# Patient Record
Sex: FEMALE | Race: WHITE | NOT HISPANIC OR LATINO | Employment: FULL TIME | URBAN - METROPOLITAN AREA
[De-identification: names, ages, dates, MRNs, and addresses within clinical notes are randomized per-mention and may not be internally consistent; named-entity substitution may affect disease eponyms.]

---

## 2020-02-13 ENCOUNTER — HOSPITAL ENCOUNTER (EMERGENCY)
Facility: HOSPITAL | Age: 55
Discharge: HOME | End: 2020-02-13
Attending: EMERGENCY MEDICINE
Payer: COMMERCIAL

## 2020-02-13 VITALS
RESPIRATION RATE: 16 BRPM | SYSTOLIC BLOOD PRESSURE: 112 MMHG | WEIGHT: 218.69 LBS | HEART RATE: 68 BPM | DIASTOLIC BLOOD PRESSURE: 68 MMHG | OXYGEN SATURATION: 97 % | HEIGHT: 66 IN | TEMPERATURE: 98 F | BODY MASS INDEX: 35.15 KG/M2

## 2020-02-13 DIAGNOSIS — K27.9 PEPTIC ULCER: ICD-10-CM

## 2020-02-13 DIAGNOSIS — R10.13 EPIGASTRIC PAIN: ICD-10-CM

## 2020-02-13 DIAGNOSIS — N28.89 RIGHT RENAL MASS: Primary | ICD-10-CM

## 2020-02-13 LAB
ALBUMIN SERPL BCP-MCNC: 3.8 G/DL (ref 3.5–5.2)
ALP SERPL-CCNC: 103 U/L (ref 55–135)
ALT SERPL W/O P-5'-P-CCNC: 25 U/L (ref 10–44)
ANION GAP SERPL CALC-SCNC: 8 MMOL/L (ref 8–16)
AST SERPL-CCNC: 17 U/L (ref 10–40)
BASOPHILS # BLD AUTO: 0.07 K/UL (ref 0–0.2)
BASOPHILS NFR BLD: 0.8 % (ref 0–1.9)
BILIRUB SERPL-MCNC: 0.2 MG/DL (ref 0.1–1)
BILIRUB UR QL STRIP: NEGATIVE
BUN SERPL-MCNC: 14 MG/DL (ref 6–20)
CALCIUM SERPL-MCNC: 9.3 MG/DL (ref 8.7–10.5)
CHLORIDE SERPL-SCNC: 105 MMOL/L (ref 95–110)
CLARITY UR: CLEAR
CO2 SERPL-SCNC: 27 MMOL/L (ref 23–29)
COLOR UR: YELLOW
CREAT SERPL-MCNC: 0.7 MG/DL (ref 0.5–1.4)
DIFFERENTIAL METHOD: ABNORMAL
EOSINOPHIL # BLD AUTO: 0.7 K/UL (ref 0–0.5)
EOSINOPHIL NFR BLD: 7.3 % (ref 0–8)
ERYTHROCYTE [DISTWIDTH] IN BLOOD BY AUTOMATED COUNT: 12.7 % (ref 11.5–14.5)
EST. GFR  (AFRICAN AMERICAN): >60 ML/MIN/1.73 M^2
EST. GFR  (NON AFRICAN AMERICAN): >60 ML/MIN/1.73 M^2
GLUCOSE SERPL-MCNC: 116 MG/DL (ref 70–110)
GLUCOSE UR QL STRIP: NEGATIVE
HCT VFR BLD AUTO: 45.4 % (ref 37–48.5)
HGB BLD-MCNC: 14.7 G/DL (ref 12–16)
HGB UR QL STRIP: ABNORMAL
IMM GRANULOCYTES # BLD AUTO: 0.03 K/UL (ref 0–0.04)
KETONES UR QL STRIP: NEGATIVE
LEUKOCYTE ESTERASE UR QL STRIP: NEGATIVE
LIPASE SERPL-CCNC: 19 U/L (ref 4–60)
LYMPHOCYTES # BLD AUTO: 2.4 K/UL (ref 1–4.8)
LYMPHOCYTES NFR BLD: 26.4 % (ref 18–48)
MCH RBC QN AUTO: 28.8 PG (ref 27–31)
MCHC RBC AUTO-ENTMCNC: 32.4 G/DL (ref 32–36)
MCV RBC AUTO: 89 FL (ref 82–98)
MONOCYTES # BLD AUTO: 0.5 K/UL (ref 0.3–1)
MONOCYTES NFR BLD: 6 % (ref 4–15)
NEUTROPHILS # BLD AUTO: 5.4 K/UL (ref 1.8–7.7)
NEUTROPHILS NFR BLD: 59.2 % (ref 38–73)
NITRITE UR QL STRIP: NEGATIVE
NRBC BLD-RTO: 0 /100 WBC
PH UR STRIP: 7 [PH] (ref 5–8)
PLATELET # BLD AUTO: 201 K/UL (ref 150–350)
PMV BLD AUTO: 10.8 FL (ref 9.2–12.9)
POTASSIUM SERPL-SCNC: 4 MMOL/L (ref 3.5–5.1)
PROT SERPL-MCNC: 7 G/DL (ref 6–8.4)
PROT UR QL STRIP: NEGATIVE
RBC # BLD AUTO: 5.1 M/UL (ref 4–5.4)
SODIUM SERPL-SCNC: 140 MMOL/L (ref 136–145)
SP GR UR STRIP: 1.02 (ref 1–1.03)
URN SPEC COLLECT METH UR: ABNORMAL
UROBILINOGEN UR STRIP-ACNC: 1 EU/DL
WBC # BLD AUTO: 9.03 K/UL (ref 3.9–12.7)

## 2020-02-13 PROCEDURE — 96374 THER/PROPH/DIAG INJ IV PUSH: CPT

## 2020-02-13 PROCEDURE — 25000003 PHARM REV CODE 250: Performed by: EMERGENCY MEDICINE

## 2020-02-13 PROCEDURE — 80053 COMPREHEN METABOLIC PANEL: CPT

## 2020-02-13 PROCEDURE — 83690 ASSAY OF LIPASE: CPT

## 2020-02-13 PROCEDURE — 81003 URINALYSIS AUTO W/O SCOPE: CPT

## 2020-02-13 PROCEDURE — 63600175 PHARM REV CODE 636 W HCPCS: Performed by: EMERGENCY MEDICINE

## 2020-02-13 PROCEDURE — 85025 COMPLETE CBC W/AUTO DIFF WBC: CPT

## 2020-02-13 PROCEDURE — 99284 EMERGENCY DEPT VISIT MOD MDM: CPT | Mod: 25

## 2020-02-13 PROCEDURE — 36415 COLL VENOUS BLD VENIPUNCTURE: CPT

## 2020-02-13 RX ORDER — PANTOPRAZOLE SODIUM 40 MG/1
40 TABLET, DELAYED RELEASE ORAL
Status: COMPLETED | OUTPATIENT
Start: 2020-02-13 | End: 2020-02-13

## 2020-02-13 RX ORDER — KETOROLAC TROMETHAMINE 30 MG/ML
30 INJECTION, SOLUTION INTRAMUSCULAR; INTRAVENOUS
Status: COMPLETED | OUTPATIENT
Start: 2020-02-13 | End: 2020-02-13

## 2020-02-13 RX ORDER — PANTOPRAZOLE SODIUM 40 MG/1
40 TABLET, DELAYED RELEASE ORAL DAILY
Qty: 30 TABLET | Refills: 3 | Status: SHIPPED | OUTPATIENT
Start: 2020-02-13 | End: 2021-02-12

## 2020-02-13 RX ADMIN — LIDOCAINE HYDROCHLORIDE: 20 SOLUTION ORAL; TOPICAL at 02:02

## 2020-02-13 RX ADMIN — KETOROLAC TROMETHAMINE 30 MG: 30 INJECTION, SOLUTION INTRAMUSCULAR at 03:02

## 2020-02-13 RX ADMIN — PANTOPRAZOLE SODIUM 40 MG: 40 TABLET, DELAYED RELEASE ORAL at 02:02

## 2020-02-13 NOTE — ED PROVIDER NOTES
Encounter Date: 2/13/2020    SCRIBE #1 NOTE: I, Marysol Pedersen, am scribing for, and in the presence of, Lan Christy MD.       History     Chief Complaint   Patient presents with    Abdominal Pain     RUQ abdominal pain       Time seen by provider: 2:30 PM on 02/13/2020    Quita Lamas is a 54 y.o. female who presents to the ED with an onset of right upper abdominal pain. Patients states that pain is chronic and ongoing for > x2 months, worsening in the passed x 2 wks. She states that abdomen bulges with associated cramping and night sweats. She endorses that pain is worsened when twisted or lying on her side. Patient denies symptoms of fever, and chills nausea, vomiting, diarrhea, or black/tarry stool. No PMHx of ulcers or liver complications. No abdominal PSHx.    The history is provided by the patient.     Review of patient's allergies indicates:   Allergen Reactions    Codeine     Iodine and iodide containing products     Penicillins      History reviewed. No pertinent past medical history.  History reviewed. No pertinent surgical history.  History reviewed. No pertinent family history.  Social History     Tobacco Use    Smoking status: Current Every Day Smoker   Substance Use Topics    Alcohol use: Not on file    Drug use: Not on file     Review of Systems   Constitutional: Positive for diaphoresis. Negative for activity change, appetite change, chills, fatigue and fever.   Eyes: Negative for visual disturbance.   Respiratory: Negative for apnea and shortness of breath.    Cardiovascular: Negative for chest pain and palpitations.   Gastrointestinal: Positive for abdominal distention and abdominal pain. Negative for blood in stool, diarrhea, nausea and vomiting.   Genitourinary: Negative for difficulty urinating.   Musculoskeletal: Negative for neck pain.   Skin: Negative for pallor and rash.   Neurological: Negative for headaches.   Hematological: Does not bruise/bleed easily.    Psychiatric/Behavioral: Negative for agitation.       Physical Exam     Initial Vitals [02/13/20 1408]   BP Pulse Resp Temp SpO2   (!) 143/72 (!) 111 16 98.1 °F (36.7 °C) 98 %      MAP       --         Physical Exam    Nursing note and vitals reviewed.  Constitutional: She appears well-developed and well-nourished.   HENT:   Head: Normocephalic and atraumatic.   Eyes: Conjunctivae are normal.   Neck: Normal range of motion. Neck supple.   Cardiovascular: Normal rate and regular rhythm. Exam reveals no gallop and no friction rub.    No murmur heard.  Pulmonary/Chest: Effort normal and breath sounds normal. No respiratory distress. She has no wheezes. She has no rhonchi. She has no rales.   Abdominal: Soft. She exhibits no distension. There is tenderness in the right upper quadrant and epigastric area. There is no CVA tenderness.   Musculoskeletal: Normal range of motion.   Neurological: She is alert and oriented to person, place, and time.   Skin: Skin is warm and dry. No erythema.   Psychiatric: She has a normal mood and affect.         ED Course   Procedures  Labs Reviewed   CBC W/ AUTO DIFFERENTIAL - Abnormal; Notable for the following components:       Result Value    Eos # 0.7 (*)     All other components within normal limits   COMPREHENSIVE METABOLIC PANEL - Abnormal; Notable for the following components:    Glucose 116 (*)     All other components within normal limits   URINALYSIS, REFLEX TO URINE CULTURE - Abnormal; Notable for the following components:    Occult Blood UA Trace (*)     All other components within normal limits    Narrative:     Preferred Collection Type->Urine, Clean Catch   LIPASE          Imaging Results          CT Abdomen Pelvis  Without Contrast (Final result)  Result time 02/13/20 17:30:10    Final result by Beverly Del Angel MD (02/13/20 17:30:10)                 Impression:      Findings as detailed above including punctate nonobstructing right renal stone.  Probable 5 mm high  density right renal mass could be complex cyst, high density cyst or solid mass.  No acute findings      Electronically signed by: Beverly Del Angel MD  Date:    02/13/2020  Time:    17:30             Narrative:    EXAMINATION:  CT ABDOMEN PELVIS WITHOUT CONTRAST    CLINICAL HISTORY:  Epigastric pain; Epigastric pain    TECHNIQUE:  Low dose axial images, sagittal and coronal reformations were obtained from the lung bases to the pubic symphysis.  No p.o. or IV contrast    COMPARISON:  None    FINDINGS:  Liver unremarkable appearance    Spleen not enlarged    Gallbladder contracted with no calcified stones or CT findings of acute cholecystitis    Adrenal glands unremarkable appearance    Pancreas unremarkable appearance    Abdominal aorta no aneurysm    Stomach, bowel, mesentery; normal appearance of the appendix.  No free intraperitoneal air or fluid.  No appreciable bowel wall thickening or inflammatory change    Reproductive organs unremarkable appearance    Small fat containing umbilical hernia    Kidneys, ureters, bladder; punctate nonobstructing right renal stone.  No hydronephrosis.  No opaque ureteral stone or ureteral obstruction.  Five mm exophytic mass upper pole of the right kidney versus lobular contour from renal scarring more suggestive of small slightly hyperdense mass for example sagittal series 602, image 73 axial series 2, image 49 and coronal series 601, image 99.  Urinary bladder just mildly distended at time of the exam and unremarkable appearance    There is bilateral SI joint sclerosis and there are osseous degenerative changes.  There is very mild retrolisthesis L3-L4.                               US Abdomen Limited (Final result)  Result time 02/13/20 15:54:24    Final result by Sal Cazares Jr., MD (02/13/20 15:54:24)                 Impression:      No significant sonographic abnormality.      Electronically signed by: Sal Cazares MD  Date:    02/13/2020  Time:    15:54              Narrative:    EXAMINATION:  US ABDOMEN LIMITED    CLINICAL HISTORY:  Epigastric pain;    TECHNIQUE:  Limited ultrasound of the right upper quadrant of the abdomen (including pancreas, liver, gallbladder, common bile duct, and spleen) was performed.    COMPARISON:  None.    FINDINGS:  Liver: Normal in size, measuring 13 cm. Homogeneous echotexture. No focal hepatic lesions.    Gallbladder: No calculi, wall thickening, or pericholecystic fluid.  No sonographic Cortez's sign.    Biliary system: The common duct is not dilated, measuring 7 mm.  No intrahepatic ductal dilatation.    The right kidney measures 11.6 cm without mass or hydronephrosis.    Miscellaneous: No upper abdominal ascites.                                 Medical Decision Making:   History:   Old Medical Records: I decided to obtain old medical records.  Clinical Tests:   Lab Tests: Ordered and Reviewed  Radiological Study: Ordered and Reviewed  ED Management:  54-year-old female presents with chronic epigastric pain which is intermittently worse with meals.  Symptoms most likely reflect peptic ulcer disease.  Right upper quadrant ultrasound fails to demonstrate any evidence of gallstones.  Biliary disease cannot be excluded.  She is encouraged to follow up with Gastroenterology for consideration of upper endoscopy.  She is also found have a right renal mass and is referred to Urology for workup of same.  There is no evidence of bowel obstruction, diverticulitis, retroperitoneal hemorrhage or pancreatitis.       APC / Resident Notes:   I, Dr. Lan Christy III, personally performed the services described in this documentation. All medical record entries made by the scribe were at my direction and in my presence.  I have reviewed the chart and agree that the record reflects my personal performance and is accurate and complete       Scribe Attestation:   Scribe #1: I performed the above scribed service and the documentation accurately describes the  services I performed. I attest to the accuracy of the note.                  Clinical Impression:       ICD-10-CM ICD-9-CM   1. Right renal mass N28.89 593.9   2. Epigastric pain R10.13 789.06   3. Peptic ulcer K27.9 533.90         Disposition:   Disposition: Discharged  Condition: Stable                     Lan Christy III, MD  02/13/20 0096

## 2020-02-13 NOTE — ED NOTES
Pt presents with complaints of right upper quadrant abdominal pain and nausea with burning sensation in throat. Pt has no know history of acid reflux. Wakes up with this daily. Feels tightening in abdomen like something in there trying to come out the side of her. Abdomen soft and non distended. Alert and oriented with neuro intact. Reports nausea at this time.

## 2022-11-29 ENCOUNTER — HOSPITAL ENCOUNTER (EMERGENCY)
Facility: HOSPITAL | Age: 57
Discharge: LEFT BEFORE DISCHARGE | End: 2022-11-30
Attending: EMERGENCY MEDICINE

## 2022-11-29 DIAGNOSIS — R42 VERTIGO: ICD-10-CM

## 2022-11-29 DIAGNOSIS — R11.0 NAUSEA: ICD-10-CM

## 2022-11-29 DIAGNOSIS — R51.9 UNILATERAL HEADACHE: ICD-10-CM

## 2022-11-29 DIAGNOSIS — R07.9 RIGHT-SIDED CHEST PAIN: ICD-10-CM

## 2022-11-29 DIAGNOSIS — M54.2 NECK PAIN, ACUTE: Primary | ICD-10-CM

## 2022-11-29 DIAGNOSIS — R20.2 PARESTHESIA OF UPPER EXTREMITY: ICD-10-CM

## 2022-11-29 LAB
ALBUMIN SERPL BCP-MCNC: 4 G/DL (ref 3.5–5.2)
ALP SERPL-CCNC: 97 U/L (ref 55–135)
ALT SERPL W/O P-5'-P-CCNC: 24 U/L (ref 10–44)
ANION GAP SERPL CALC-SCNC: 11 MMOL/L (ref 8–16)
AST SERPL-CCNC: 18 U/L (ref 10–40)
BILIRUB SERPL-MCNC: 0.2 MG/DL (ref 0.1–1)
BNP SERPL-MCNC: 27 PG/ML (ref 0–99)
BUN SERPL-MCNC: 11 MG/DL (ref 6–20)
CALCIUM SERPL-MCNC: 9.4 MG/DL (ref 8.7–10.5)
CHLORIDE SERPL-SCNC: 103 MMOL/L (ref 95–110)
CO2 SERPL-SCNC: 24 MMOL/L (ref 23–29)
CREAT SERPL-MCNC: 0.7 MG/DL (ref 0.5–1.4)
EST. GFR  (NO RACE VARIABLE): >60 ML/MIN/1.73 M^2
GLUCOSE SERPL-MCNC: 106 MG/DL (ref 70–110)
INFLUENZA A, MOLECULAR: NEGATIVE
INFLUENZA B, MOLECULAR: NEGATIVE
POTASSIUM SERPL-SCNC: 4.4 MMOL/L (ref 3.5–5.1)
PROT SERPL-MCNC: 7.1 G/DL (ref 6–8.4)
SARS-COV-2 RDRP RESP QL NAA+PROBE: NEGATIVE
SODIUM SERPL-SCNC: 138 MMOL/L (ref 136–145)
SPECIMEN SOURCE: NORMAL

## 2022-11-29 PROCEDURE — 63600175 PHARM REV CODE 636 W HCPCS: Performed by: EMERGENCY MEDICINE

## 2022-11-29 PROCEDURE — 83880 ASSAY OF NATRIURETIC PEPTIDE: CPT | Performed by: EMERGENCY MEDICINE

## 2022-11-29 PROCEDURE — 85007 BL SMEAR W/DIFF WBC COUNT: CPT | Performed by: EMERGENCY MEDICINE

## 2022-11-29 PROCEDURE — 93010 EKG 12-LEAD: ICD-10-PCS | Mod: ,,, | Performed by: INTERNAL MEDICINE

## 2022-11-29 PROCEDURE — 93010 ELECTROCARDIOGRAM REPORT: CPT | Mod: ,,, | Performed by: INTERNAL MEDICINE

## 2022-11-29 PROCEDURE — 80053 COMPREHEN METABOLIC PANEL: CPT | Performed by: EMERGENCY MEDICINE

## 2022-11-29 PROCEDURE — U0002 COVID-19 LAB TEST NON-CDC: HCPCS | Performed by: EMERGENCY MEDICINE

## 2022-11-29 PROCEDURE — 25000003 PHARM REV CODE 250: Performed by: EMERGENCY MEDICINE

## 2022-11-29 PROCEDURE — 87502 INFLUENZA DNA AMP PROBE: CPT | Performed by: EMERGENCY MEDICINE

## 2022-11-29 PROCEDURE — 36415 COLL VENOUS BLD VENIPUNCTURE: CPT | Performed by: EMERGENCY MEDICINE

## 2022-11-29 PROCEDURE — 85027 COMPLETE CBC AUTOMATED: CPT | Performed by: EMERGENCY MEDICINE

## 2022-11-29 PROCEDURE — 96374 THER/PROPH/DIAG INJ IV PUSH: CPT

## 2022-11-29 PROCEDURE — 94760 N-INVAS EAR/PLS OXIMETRY 1: CPT

## 2022-11-29 PROCEDURE — 93005 ELECTROCARDIOGRAM TRACING: CPT

## 2022-11-29 PROCEDURE — 84484 ASSAY OF TROPONIN QUANT: CPT | Performed by: EMERGENCY MEDICINE

## 2022-11-29 PROCEDURE — 99285 EMERGENCY DEPT VISIT HI MDM: CPT | Mod: 25

## 2022-11-29 RX ORDER — ASPIRIN 325 MG
325 TABLET ORAL
Status: COMPLETED | OUTPATIENT
Start: 2022-11-29 | End: 2022-11-29

## 2022-11-29 RX ORDER — KETOROLAC TROMETHAMINE 30 MG/ML
10 INJECTION, SOLUTION INTRAMUSCULAR; INTRAVENOUS
Status: COMPLETED | OUTPATIENT
Start: 2022-11-29 | End: 2022-11-29

## 2022-11-29 RX ADMIN — ASPIRIN 325 MG: 325 TABLET ORAL at 10:11

## 2022-11-29 RX ADMIN — KETOROLAC TROMETHAMINE 10 MG: 30 INJECTION, SOLUTION INTRAMUSCULAR at 10:11

## 2022-11-30 VITALS
SYSTOLIC BLOOD PRESSURE: 118 MMHG | HEART RATE: 70 BPM | HEIGHT: 64 IN | RESPIRATION RATE: 20 BRPM | WEIGHT: 215 LBS | DIASTOLIC BLOOD PRESSURE: 57 MMHG | TEMPERATURE: 97 F | BODY MASS INDEX: 36.7 KG/M2 | OXYGEN SATURATION: 95 %

## 2022-11-30 LAB
BASOPHILS # BLD AUTO: ABNORMAL K/UL (ref 0–0.2)
BASOPHILS NFR BLD: 0 % (ref 0–1.9)
DIFFERENTIAL METHOD: ABNORMAL
EOSINOPHIL # BLD AUTO: ABNORMAL K/UL (ref 0–0.5)
EOSINOPHIL NFR BLD: 1 % (ref 0–8)
ERYTHROCYTE [DISTWIDTH] IN BLOOD BY AUTOMATED COUNT: 12.9 % (ref 11.5–14.5)
HCT VFR BLD AUTO: 46.2 % (ref 37–48.5)
HGB BLD-MCNC: 14.9 G/DL (ref 12–16)
IMM GRANULOCYTES # BLD AUTO: ABNORMAL K/UL (ref 0–0.04)
IMM GRANULOCYTES NFR BLD AUTO: ABNORMAL % (ref 0–0.5)
LYMPHOCYTES # BLD AUTO: ABNORMAL K/UL (ref 1–4.8)
LYMPHOCYTES NFR BLD: 21 % (ref 18–48)
MCH RBC QN AUTO: 29.8 PG (ref 27–31)
MCHC RBC AUTO-ENTMCNC: 32.3 G/DL (ref 32–36)
MCV RBC AUTO: 92 FL (ref 82–98)
MONOCYTES # BLD AUTO: ABNORMAL K/UL (ref 0.3–1)
MONOCYTES NFR BLD: 2 % (ref 4–15)
NEUTROPHILS NFR BLD: 76 % (ref 38–73)
NRBC BLD-RTO: 0 /100 WBC
PLATELET # BLD AUTO: 213 K/UL (ref 150–450)
PLATELET BLD QL SMEAR: ABNORMAL
PMV BLD AUTO: 11.1 FL (ref 9.2–12.9)
RBC # BLD AUTO: 5 M/UL (ref 4–5.4)
TROPONIN I SERPL DL<=0.01 NG/ML-MCNC: <0.006 NG/ML (ref 0–0.03)
WBC # BLD AUTO: 9.73 K/UL (ref 3.9–12.7)

## 2022-11-30 PROCEDURE — 25000003 PHARM REV CODE 250: Performed by: EMERGENCY MEDICINE

## 2022-11-30 PROCEDURE — 63600175 PHARM REV CODE 636 W HCPCS: Performed by: EMERGENCY MEDICINE

## 2022-11-30 PROCEDURE — 96375 TX/PRO/DX INJ NEW DRUG ADDON: CPT

## 2022-11-30 PROCEDURE — 25500020 PHARM REV CODE 255

## 2022-11-30 RX ORDER — MECLIZINE HYDROCHLORIDE 25 MG/1
25 TABLET ORAL
Status: COMPLETED | OUTPATIENT
Start: 2022-11-30 | End: 2022-11-30

## 2022-11-30 RX ORDER — DIPHENHYDRAMINE HYDROCHLORIDE 50 MG/ML
25 INJECTION INTRAMUSCULAR; INTRAVENOUS
Status: COMPLETED | OUTPATIENT
Start: 2022-11-30 | End: 2022-11-30

## 2022-11-30 RX ADMIN — IOHEXOL 100 ML: 350 INJECTION, SOLUTION INTRAVENOUS at 01:11

## 2022-11-30 RX ADMIN — DIPHENHYDRAMINE HYDROCHLORIDE 25 MG: 50 INJECTION INTRAMUSCULAR; INTRAVENOUS at 01:11

## 2022-11-30 RX ADMIN — MECLIZINE HYDROCHLORIDE 25 MG: 25 TABLET ORAL at 12:11

## 2022-11-30 NOTE — ED PROVIDER NOTES
"Encounter Date: 11/29/2022    SCRIBE #1 NOTE: I, Sofía Lopezmarion, am scribing for, and in the presence of,  Justin Landa MD.     History     Chief Complaint   Patient presents with    General Illness     Pt presents with multiple complaints. Headache, nausea, "don't feel right"        Time seen by provider: 10:20 PM on 11/29/2022    Quita Lamas is a 57 y.o. female who presents to the ED for an evaluation of multiple complaints. The patient states she woke up at 5:30 PM tonight before going to work when she had a mild headache which severely intensified after a few hours. She notes the headache radiates from the right side of her neck and up the right side of her head. The patient c/o left arm numbness shortly after HA onset which then improved but worsened in her right arm. Patient also reports vomiting after HA onset. She notes after putting an ice pack onto her neck, her headache is now a 2/10, and she is no longer vomiting. The patient states she slept on her couch yesterday. The patient denies chest pain or any other symptoms at this time. She reports Hx of cigarette smoking. No pertinent PMHx or PSHx.       The history is provided by the patient.   Review of patient's allergies indicates:   Allergen Reactions    Codeine     Iodine and iodide containing products     Penicillins      No past medical history on file.  No past surgical history on file.  No family history on file.  Social History     Tobacco Use    Smoking status: Every Day     Review of Systems   Constitutional:  Negative for fever.   HENT:  Negative for sore throat.    Respiratory:  Negative for shortness of breath.    Cardiovascular:  Negative for chest pain.   Gastrointestinal:  Positive for nausea and vomiting.   Genitourinary:  Negative for dysuria.   Musculoskeletal:  Positive for neck pain. Negative for back pain.   Skin:  Negative for rash.   Neurological:  Positive for headaches. Negative for weakness.   Hematological:  Does " not bruise/bleed easily.     Physical Exam     Initial Vitals [11/29/22 1917]   BP Pulse Resp Temp SpO2   (!) 152/84 80 (!) 21 96.9 °F (36.1 °C) 97 %      MAP       --         Physical Exam    Nursing note and vitals reviewed.  Constitutional: Vital signs are normal. She appears well-developed and well-nourished.  Non-toxic appearance. No distress.   HENT:   Head: Normocephalic and atraumatic.   Eyes: Conjunctivae and EOM are normal. Pupils are equal, round, and reactive to light.   Neck: Neck supple. No JVD present.   Normal range of motion.  Cardiovascular:  Normal rate, regular rhythm, normal heart sounds and intact distal pulses.     Exam reveals no gallop and no friction rub.       No murmur heard.  Pulmonary/Chest: Breath sounds normal. She has no wheezes. She has no rhonchi. She has no rales.   Abdominal: Abdomen is soft. Bowel sounds are normal. There is no abdominal tenderness. There is no rebound and no guarding.   Musculoskeletal:         General: Normal range of motion.      Cervical back: Normal range of motion and neck supple. No rigidity.     Neurological: She is alert and oriented to person, place, and time. She has normal strength and normal reflexes. She is not disoriented. No cranial nerve deficit or sensory deficit. She exhibits normal muscle tone. Coordination normal. GCS eye subscore is 4. GCS verbal subscore is 5. GCS motor subscore is 6.   No focal neurological deficits noted. Cranial nerves III-XII grossly intact. Equal rapid alternating movements noted.      Skin: Skin is warm and dry.   Psychiatric: She has a normal mood and affect. Her speech is normal and behavior is normal. She is not actively hallucinating.       ED Course   Procedures  Labs Reviewed   CBC W/ AUTO DIFFERENTIAL - Abnormal; Notable for the following components:       Result Value    Gran % 76.0 (*)     Mono % 2.0 (*)     All other components within normal limits   INFLUENZA A & B BY MOLECULAR   COMPREHENSIVE METABOLIC  PANEL   TROPONIN I   B-TYPE NATRIURETIC PEPTIDE   SARS-COV-2 RNA AMPLIFICATION, QUAL     EKG Readings: (Independently Interpreted)   Initial Reading: No STEMI. Rhythm: Normal Sinus Rhythm. Heart Rate: 69. Axis: Right Axis Deviation.   ECG Results              EKG 12-lead (In process)  Result time 11/30/22 08:16:40      In process by Interface, Lab In [facility] (11/30/22 08:16:40)                   Narrative:    Test Reason : R07.9,    Vent. Rate : 069 BPM     Atrial Rate : 069 BPM     P-R Int : 128 ms          QRS Dur : 082 ms      QT Int : 442 ms       P-R-T Axes : 018 093 058 degrees     QTc Int : 473 ms    Normal sinus rhythm  Rightward axis  Borderline Abnormal ECG  No previous ECGs available    Referred By: [email or web address]   SELF           Confirmed By:                                   Imaging Results              CTA Head and Neck (xpd) (Final result)  Result time 11/30/22 06:41:44      Final result by Jamison Zayas MD (11/30/22 06:41:44)                   Impression:      1. The cervical vertebral and carotid arteries are widely patent without critical stenosis, occlusion, thrombosis or dissection.  Similarly, the intracranial arteries are patent without large vessel occlusion, critical stenosis, thrombus, dissection or aneurysm.  There is developmental variation of the hypoplastic right A1 segment.  Note: Preliminary results were provided by Dr. Forrest Eason ([facility]).  There is no significant discrepancy.      Electronically signed by: Jamison Zayas MD  Date:    11/30/2022  Time:    06:41               Narrative:    EXAMINATION:  CTA HEAD AND NECK (XPD)    CLINICAL HISTORY:  Stroke/TIA, determine embolic source;    TECHNIQUE:  .  CT angiogram was performed from the level of the conchita to the top of the head following the IV administration of 100 mL of Omnipaque 350.   Sagittal and coronal reconstructions and maximum intensity projection reconstructions were performed. Arterial stenosis percentages are  based on NASCET measurement criteria.    COMPARISON:  Head CT dated 11/29/2022, brain MRI dated 11/30/2022    FINDINGS:  Head CT:    There is no abnormal enhancement on the delayed post-contrast images of the brain.  Chronic sinus disease with scattered mucosal thickening is redemonstrated.    CTA Neck:    Arch and great vessels:    There is a left-sided aortic arch.  There is a common origin of the brachiocephalic trunk and left common carotid artery.  The aortic arch and the origins of the great vessels are widely patent.  The included subclavian arteries are patent as well.    Carotid arteries:    The common, internal and external carotid arteries are normal in caliber and contour and widely patent throughout their course in the neck without stenosis, occlusion, thrombosis or dissection.  There is minimal plaque at the left carotid bulb.    Vertebral arteries:    The right vertebral artery is slightly dominant.  Both vertebral arteries are widely patent throughout their course in the neck without critical stenosis, occlusion, thrombosis or dissection.    Other:    There is degenerative change in the cervical spine with marked disc space narrowing at the C5-6 and C6-7 levels.  The included lungs are clear without mass or infiltrate.  The airway is patent.  Fat and soft tissue planes in the neck are preserved.  There is no dominant mass or pathologic lymphadenopathy.    CTA Head:    Anterior circulation:    The petrous and cavernous segments of both internal carotid arteries are patent as are the supraclinoid segments.  The carotid terminus is normal bilaterally.  There is branching into the middle cerebral arteries which are widely patent.  There is a patent and dominant left A1 segment.  There is a patent anterior communicating artery.  Both A2 segments are patent.  The right A1 segment is hypoplastic.  There is no large vessel occlusion, critical stenosis, thrombosis, dissection or large aneurysm.    Posterior  circulation:    The vertebral and basilar arteries are patent.  The basilar tip is normal.  There is normal branching into the superior cerebellar and posterior cerebral arteries.  There are small patent bilateral posterior communicating arteries.  There is no critical stenosis, large vessel occlusion, thrombus, dissection or large aneurysm.    Dural sinuses, other:    The dural sinuses are patent.                                       MRI Brain Without Contrast (Final result)  Result time 11/30/22 06:34:37      Final result by Jamison Zayas MD (11/30/22 06:34:37)                   Impression:      1. There is no acute abnormality.  There is no hemorrhage, mass or acute infarction.  2. Mildly expanded partially empty sella.  3. Chronic sinus disease  Note: Preliminary results were provided by Dr. Forrest Eason ([facility]).  There is no significant discrepancy.      Electronically signed by: Jamison Zayas MD  Date:    11/30/2022  Time:    06:34               Narrative:    EXAM:  MRI BRAIN WITHOUT CONTRAST    CLINICAL HISTORY:  Dizziness, persistent/recurrent, cardiac or vascular cause suspected; .    COMPARISON:  Head CT dated 11/29/2022    FINDINGS:  Intracranial contents:There is no acute intracranial abnormality.  Specifically, there is no intracranial hemorrhage.  There are no regions of restricted diffusion to suggest acute infarction.  There is only mild volume loss and minimal nonspecific white matter change this.  There is no hydrocephalus or midline shift.  There is no abnormal extra-axial fluid collection.  The basilar cisterns are open.  Flow voids indicating patency are present in the major vessels at the base of the brain.    There is a mildly expanded partially empty sella.  The orbits are grossly normal.    Extracranial contents, calvarium, soft tissues:Baseline marrow signal is normal.  There is mucosal thickening scattered cysts in the paranasal sinuses.  The mastoid air cells are clear.                                        X-Ray Chest AP Portable (Final result)  Result time 11/29/22 23:46:00      Final result by Conner Sheriff DO (11/29/22 23:46:00)                   Impression:      No acute abnormality.      Electronically signed by: Conner Sheriff  Date:    11/29/2022  Time:    23:46               Narrative:    EXAMINATION:  XR CHEST AP PORTABLE    CLINICAL HISTORY:  Chest pain, unspecified    TECHNIQUE:  Single frontal view of the chest was performed.    COMPARISON:  None    FINDINGS:  The lungs are well expanded and clear. No focal opacities are seen. The pleural spaces are clear.    The cardiac silhouette is unremarkable.    The visualized osseous structures are unremarkable.                                       CT Head Without Contrast (Final result)  Result time 11/29/22 19:34:33      Final result by Conner Sheriff DO (11/29/22 19:34:33)                   Impression:      No acute intracranial abnormality.    Mucosal thickening of the right maxillary and bilateral ethmoid sinuses.      Electronically signed by: Conner Sheriff  Date:    11/29/2022  Time:    19:34               Narrative:    EXAMINATION:  CT HEAD WITHOUT CONTRAST    CLINICAL HISTORY:  Headache, sudden, severe;    TECHNIQUE:  Low dose axial CT images obtained throughout the head without intravenous contrast. Sagittal and coronal reconstructions were performed.    COMPARISON:  None available.    FINDINGS:  Ventricles and sulci are normal in size for age without evidence of hydrocephalus. No extra-axial blood or fluid collections.  The brain parenchyma is normal. No parenchymal mass, hemorrhage, edema or major vascular distribution infarct.    No calvarial fracture.  The scalp is unremarkable.  Mucosal thickening of the right maxillary and bilateral ethmoid sinuses.  The mastoid air cells are clear.  There is cerumen impaction in the right EAC.                                       Medications   aspirin tablet 325 mg (325 mg  Oral Given 11/29/22 2255)   ketorolac injection 9.999 mg (9.999 mg Intravenous Given 11/29/22 2255)   meclizine tablet 25 mg (25 mg Oral Given 11/30/22 0025)   diphenhydrAMINE injection 25 mg (25 mg Intravenous Given 11/30/22 0131)   iohexoL (OMNIPAQUE 350) 350 mg iodine/mL injection (100 mLs Intravenous Given 11/30/22 0153)     Medical Decision Making:   History:   Old Medical Records: I decided to obtain old medical records.  Initial Assessment:   56 yo woman c/o right post neck pain and right HA gradually worsening since waking up on the couch at 5 am. Associated Vertigo, N/V, and rght chest tightness with tingling in b/l arms. Neuro exam normal. CT head without CVA or ICH. Icepack to neck and toradol nearly resolved symptoms. CTA H&N and MRI ordered after discussion with Dr. Cherry to r/o post cva/vert art injury. Care turned over to Dr. Vila at end of shift pending imaging.  Clinical Tests:   Lab Tests: Ordered and Reviewed  Radiological Study: Ordered and Reviewed  Medical Tests: Ordered and Reviewed        Scribe Attestation:   Scribe #1: I performed the above scribed service and the documentation accurately describes the services I performed. I attest to the accuracy of the note.      ED Course as of 11/30/22 0919 Wed Nov 30, 2022   0409 The patient was handed over to me at the change shift by Dr. Landa pending her MRI and CTA of her head.  The patient eloped from the emergency department prior to these results returning. [PE]      ED Course User Index  [PE] Mu Vila MD               I, Cordell Raya, personally performed the services described in this documentation. All medical record entries made by the scribe were at my direction and in my presence.  I have reviewed the chart and agree that the record reflects my personal performance and is accurate and complete. Justin Landa MD.  9:19 AM 11/30/2022       DISCLAIMER: This note was prepared with [employer] Direct voice recognition  transcription software. Garbled syntax, mangled pronouns, and other bizarre constructions may be attributed to that software system.    Clinical Impression:   Final diagnoses:  [R07.9] Right-sided chest pain  [M54.2] Neck pain, acute (Primary)  [R51.9] Unilateral headache  [R11.0] Nausea  [R20.2] Paresthesia of upper extremity - bilateral  [R42] Vertigo        ED Disposition Condition    Eloped                 Justin Landa MD  11/30/22 0919

## 2022-11-30 NOTE — ED NOTES
Pt eloped from room. Attempted to stop patient to get IV removed, patient continued to walk away.  Called patient to come back for IV removal by nursing staff and security. Left Message.

## 2022-11-30 NOTE — ED NOTES
Spoke with security staff, Jenn, and he reports talking with the patient and advising her to please return to hospital to let us remove her IV access. Per security pt reports that she would. At this time pt has not returned. Security also reports that detail staff here tonight will call appropriate police jurisdiction for pt to do welfare check in regards to IV access.

## 2022-11-30 NOTE — ED NOTES
Patient resting comfortably in bed, call light within reach, bed in low position, and family at bedside. Patient states that her headache has resolved from applying ice bag to the area.

## 2024-07-26 ENCOUNTER — HOSPITAL ENCOUNTER (EMERGENCY)
Facility: HOSPITAL | Age: 59
Discharge: HOME | End: 2024-07-26
Attending: EMERGENCY MEDICINE
Payer: COMMERCIAL

## 2024-07-26 VITALS
DIASTOLIC BLOOD PRESSURE: 56 MMHG | HEART RATE: 76 BPM | HEIGHT: 64 IN | BODY MASS INDEX: 36.54 KG/M2 | TEMPERATURE: 98 F | WEIGHT: 214 LBS | SYSTOLIC BLOOD PRESSURE: 108 MMHG | OXYGEN SATURATION: 97 % | RESPIRATION RATE: 18 BRPM

## 2024-07-26 DIAGNOSIS — G89.29 CHRONIC UPPER ABDOMINAL PAIN: Primary | ICD-10-CM

## 2024-07-26 DIAGNOSIS — R10.10 CHRONIC UPPER ABDOMINAL PAIN: Primary | ICD-10-CM

## 2024-07-26 DIAGNOSIS — R10.11 RUQ ABDOMINAL PAIN: ICD-10-CM

## 2024-07-26 LAB
ALBUMIN SERPL BCP-MCNC: 3.8 G/DL (ref 3.5–5.2)
ALP SERPL-CCNC: 81 U/L (ref 55–135)
ALT SERPL W/O P-5'-P-CCNC: 26 U/L (ref 10–44)
ANION GAP SERPL CALC-SCNC: 9 MMOL/L (ref 8–16)
AST SERPL-CCNC: 18 U/L (ref 10–40)
BASOPHILS # BLD AUTO: 0.05 K/UL (ref 0–0.2)
BASOPHILS NFR BLD: 0.6 % (ref 0–1.9)
BILIRUB SERPL-MCNC: 0.2 MG/DL (ref 0.1–1)
BUN SERPL-MCNC: 13 MG/DL (ref 6–20)
CALCIUM SERPL-MCNC: 9.4 MG/DL (ref 8.7–10.5)
CHLORIDE SERPL-SCNC: 105 MMOL/L (ref 95–110)
CO2 SERPL-SCNC: 26 MMOL/L (ref 23–29)
CREAT SERPL-MCNC: 0.9 MG/DL (ref 0.5–1.4)
DIFFERENTIAL METHOD BLD: NORMAL
EOSINOPHIL # BLD AUTO: 0.4 K/UL (ref 0–0.5)
EOSINOPHIL NFR BLD: 4.5 % (ref 0–8)
ERYTHROCYTE [DISTWIDTH] IN BLOOD BY AUTOMATED COUNT: 12.9 % (ref 11.5–14.5)
EST. GFR  (NO RACE VARIABLE): >60 ML/MIN/1.73 M^2
GLUCOSE SERPL-MCNC: 113 MG/DL (ref 70–110)
HCT VFR BLD AUTO: 44.8 % (ref 37–48.5)
HGB BLD-MCNC: 14.9 G/DL (ref 12–16)
IMM GRANULOCYTES # BLD AUTO: 0.02 K/UL (ref 0–0.04)
IMM GRANULOCYTES NFR BLD AUTO: 0.2 % (ref 0–0.5)
LIPASE SERPL-CCNC: 17 U/L (ref 4–60)
LYMPHOCYTES # BLD AUTO: 2.5 K/UL (ref 1–4.8)
LYMPHOCYTES NFR BLD: 29.4 % (ref 18–48)
MCH RBC QN AUTO: 29.5 PG (ref 27–31)
MCHC RBC AUTO-ENTMCNC: 33.3 G/DL (ref 32–36)
MCV RBC AUTO: 89 FL (ref 82–98)
MONOCYTES # BLD AUTO: 0.6 K/UL (ref 0.3–1)
MONOCYTES NFR BLD: 7.2 % (ref 4–15)
NEUTROPHILS # BLD AUTO: 4.9 K/UL (ref 1.8–7.7)
NEUTROPHILS NFR BLD: 58.1 % (ref 38–73)
NRBC BLD-RTO: 0 /100 WBC
PLATELET # BLD AUTO: 210 K/UL (ref 150–450)
PMV BLD AUTO: 10.9 FL (ref 9.2–12.9)
POTASSIUM SERPL-SCNC: 3.9 MMOL/L (ref 3.5–5.1)
PROT SERPL-MCNC: 6.7 G/DL (ref 6–8.4)
RBC # BLD AUTO: 5.05 M/UL (ref 4–5.4)
SODIUM SERPL-SCNC: 140 MMOL/L (ref 136–145)
WBC # BLD AUTO: 8.36 K/UL (ref 3.9–12.7)

## 2024-07-26 PROCEDURE — 99284 EMERGENCY DEPT VISIT MOD MDM: CPT | Mod: 25

## 2024-07-26 PROCEDURE — 93010 ELECTROCARDIOGRAM REPORT: CPT | Mod: ,,, | Performed by: INTERNAL MEDICINE

## 2024-07-26 PROCEDURE — 80053 COMPREHEN METABOLIC PANEL: CPT | Performed by: EMERGENCY MEDICINE

## 2024-07-26 PROCEDURE — 83690 ASSAY OF LIPASE: CPT | Performed by: EMERGENCY MEDICINE

## 2024-07-26 PROCEDURE — 85025 COMPLETE CBC W/AUTO DIFF WBC: CPT | Performed by: EMERGENCY MEDICINE

## 2024-07-26 PROCEDURE — 36415 COLL VENOUS BLD VENIPUNCTURE: CPT | Performed by: EMERGENCY MEDICINE

## 2024-07-26 PROCEDURE — 93005 ELECTROCARDIOGRAM TRACING: CPT

## 2024-07-26 NOTE — ED PROVIDER NOTES
Chief complaint:  Abdominal Pain (RUQ pain with nausea, no appetite and bloated )      HPI:  Quita Lamas is a 58 y.o. female with hx chronic abdominal pain with RUQ predominance (per PCP note 2/2024), chronic constipation, ventral hernia s/p repair 2021 presenting with continued, unchanged pain since that time.  Pain will typically lasts several hours at a time, not necessarily related to eating.  It is primarily in the right upper area but sometimes generalizes to her abdomen.  There is no difference in severity or duration of pain today.  She has not yet followed up with GI as previously recommended in February.  She has had frequent associated nausea without emesis.  No change in bowel movements or blood in the stools.  No urinary complaints such as urinary frequency, urgency, dysuria, hematuria.  No flank pain.    ROS: As per HPI and below:  No fever, chest pain, dyspnea, rash, new swelling, syncope.    Review of patient's allergies indicates:   Allergen Reactions    Codeine     Iodine and iodide containing products     Penicillins        Discharge Medication List as of 7/26/2024  5:16 PM        CONTINUE these medications which have NOT CHANGED    Details   pantoprazole (PROTONIX) 40 MG tablet Take 1 tablet (40 mg total) by mouth once daily., Starting Thu 2/13/2020, Until Fri 2/12/2021, Print             PMH:  As per HPI and below:  No past medical history on file.  No past surgical history on file.    Social History     Socioeconomic History    Marital status:    Tobacco Use    Smoking status: Every Day       No family history on file.    Physical Exam:    Vitals:    07/26/24 1701   BP: (!) 108/56   Pulse: 76   Resp:    Temp:      GENERAL:  No apparent distress.  Alert.    HEENT:  Moist mucous membranes.  Normocephalic and atraumatic.    NECK:  No swelling.  Midline trachea.   CARDIOVASCULAR:  Regular rate and rhythm.  2+ radial pulses.    PULMONARY:  Lungs clear to auscultation bilaterally.   No wheezes, rales, or rhonci.    ABDOMEN:  Minimal right upper and slightly more prominent epigastric tenderness.  No voluntary or involuntary guarding.  No distention, masses, palpable hernias.  Negative Cortez sign.  No lower abdominal tenderness and no tenderness at McBurney's point.  EXTREMITIES:  Warm and well perfused.  Brisk capillary refill.  2+ DP and PT pulses.  NEUROLOGICAL:  Normal mental status.  Appropriate and conversant.    SKIN:  No rashes or ecchymoses.    BACK:  Atraumatic.  No CVA tenderness to palpation.      Labs Reviewed   COMPREHENSIVE METABOLIC PANEL - Abnormal       Result Value    Sodium 140      Potassium 3.9      Chloride 105      CO2 26      Glucose 113 (*)     BUN 13      Creatinine 0.9      Calcium 9.4      Total Protein 6.7      Albumin 3.8      Total Bilirubin 0.2      Alkaline Phosphatase 81      AST 18      ALT 26      eGFR >60      Anion Gap 9     CBC W/ AUTO DIFFERENTIAL    WBC 8.36      RBC 5.05      Hemoglobin 14.9      Hematocrit 44.8      MCV 89      MCH 29.5      MCHC 33.3      RDW 12.9      Platelets 210      MPV 10.9      Immature Granulocytes 0.2      Gran # (ANC) 4.9      Immature Grans (Abs) 0.02      Lymph # 2.5      Mono # 0.6      Eos # 0.4      Baso # 0.05      nRBC 0      Gran % 58.1      Lymph % 29.4      Mono % 7.2      Eosinophil % 4.5      Basophil % 0.6      Differential Method Automated     LIPASE    Lipase 17         Discharge Medication List as of 7/26/2024  5:16 PM        CONTINUE these medications which have NOT CHANGED    Details   pantoprazole (PROTONIX) 40 MG tablet Take 1 tablet (40 mg total) by mouth once daily., Starting u 2/13/2020, Until Fri 2/12/2021, Print             Orders Placed This Encounter   Procedures    Complete Blood Count (CBC)    Comprehensive Metabolic Panel (CMP)    Lipase    EKG 12-lead       Imaging Results    None         ED Course as of 07/26/24 2120 Fri Jul 26, 2024   1620 Bedside RUQ US:  Normal-appearing gallbladder  with no distention, wall thickening, gallstones, or pericholecystic fluid or inflammation.  Negative sonographic Cortez sign. [MR]   1627 EKG:  Normal sinus rhythm at a rate of 80.  Normal intervals.  Normal axis.  No significant ST or T wave changes suggesting acute ischemia or infarction.  (Independently interpreted by me)   [MR]      ED Course User Index  [MR] Gabriel Pace MD       MDM:    58 y.o. female with chronic pain mostly in the upper abdomen, right upper specifically with benign exam and no recent significant change.  She has not yet followed up with GI which would greatly be to her benefit.  Differential reviewed with patient including peptic ulcer disease or gastritis or duodenitis.  Bedside ultrasound as well as clinical history is not supportive of cholecystitis.  There are no signs of gallstones on bedside ultrasound as well.  Less likely gallbladder etiology.  I do not think other emergent imaging is indicated.  I have very low suspicion for life-threatening processes such as abscess, perforated viscus, obstruction, atypical appendicitis.  I do not think emergent surgical consultation is indicated.  Laboratories sent here with low suspicion for acute end-organ dysfunction such as pancreatitis or acute hepatitis.  No urinary complaints and I doubt UTI.  Follow up with GI.  Return precautions reviewed.    Diagnoses:    1. Chronic right upper quadrant abdominal pain       Gabriel Pace MD  07/26/24 2120

## 2024-07-29 ENCOUNTER — TELEPHONE (OUTPATIENT)
Dept: GASTROENTEROLOGY | Facility: CLINIC | Age: 59
End: 2024-07-29
Payer: COMMERCIAL

## 2024-07-29 LAB
OHS QRS DURATION: 84 MS
OHS QTC CALCULATION: 468 MS

## 2024-07-29 NOTE — TELEPHONE ENCOUNTER
----- Message from Quita Treviño sent at 7/29/2024 11:13 AM CDT -----  Contact: pt  Type:  Sooner Apoointment Request    Caller is requesting a sooner appointment.  Caller declined first available appointment listed below.  Caller will not accept being placed on the waitlist and is requesting a message be sent to doctor.    Name of Caller: pt    When is the first available appointment? None    Symptoms: ED follow up for abdominal pain, possible obstruction    Would the patient rather a call back or a response via [facility]?  Call back    Best Call Back Number: [phone]    Additional Information:  please call to advise  Thanks

## 2024-07-29 NOTE — TELEPHONE ENCOUNTER
Tried to schedule patient for tomorrow with NP but her insurance is out of network. She states unable to afford she will check with her insurance to see who is in network.